# Patient Record
Sex: MALE | Race: ASIAN | NOT HISPANIC OR LATINO | Employment: UNEMPLOYED | URBAN - METROPOLITAN AREA
[De-identification: names, ages, dates, MRNs, and addresses within clinical notes are randomized per-mention and may not be internally consistent; named-entity substitution may affect disease eponyms.]

---

## 2018-04-30 ENCOUNTER — HISTORICAL (OUTPATIENT)
Dept: ADMINISTRATIVE | Facility: HOSPITAL | Age: 78
End: 2018-04-30

## 2018-04-30 LAB
ABS NEUT (OLG): 3.41 X10(3)/MCL (ref 2.1–9.2)
ALBUMIN SERPL-MCNC: 3.8 GM/DL (ref 3.4–5)
ALBUMIN/GLOB SERPL: 1 RATIO (ref 1–2)
ALP SERPL-CCNC: 70 UNIT/L (ref 45–117)
ALT SERPL-CCNC: 30 UNIT/L (ref 12–78)
APPEARANCE, UA: CLEAR
AST SERPL-CCNC: 22 UNIT/L (ref 15–37)
BACTERIA #/AREA URNS AUTO: ABNORMAL /[HPF]
BASOPHILS # BLD AUTO: 0.03 X10(3)/MCL
BASOPHILS NFR BLD AUTO: 0 %
BILIRUB SERPL-MCNC: 0.2 MG/DL (ref 0.2–1)
BILIRUB UR QL STRIP: NEGATIVE
BILIRUBIN DIRECT+TOT PNL SERPL-MCNC: <0.1 MG/DL
BILIRUBIN DIRECT+TOT PNL SERPL-MCNC: ABNORMAL MG/DL
BUN SERPL-MCNC: 25 MG/DL (ref 7–18)
CALCIUM SERPL-MCNC: 8.8 MG/DL (ref 8.5–10.1)
CHLORIDE SERPL-SCNC: 109 MMOL/L (ref 98–107)
CO2 SERPL-SCNC: 27 MMOL/L (ref 21–32)
COLOR UR: NORMAL
CREAT SERPL-MCNC: 1.7 MG/DL (ref 0.6–1.3)
EOSINOPHIL # BLD AUTO: 0.19 10*3/UL
EOSINOPHIL NFR BLD AUTO: 2 %
ERYTHROCYTE [DISTWIDTH] IN BLOOD BY AUTOMATED COUNT: 16.1 % (ref 11.5–14.5)
EST. AVERAGE GLUCOSE BLD GHB EST-MCNC: 126 MG/DL
GLOBULIN SER-MCNC: 4.6 GM/ML (ref 2.3–3.5)
GLUCOSE (UA): NORMAL
GLUCOSE SERPL-MCNC: 91 MG/DL (ref 74–106)
HBA1C MFR BLD: 6 % (ref 4.2–6.3)
HCT VFR BLD AUTO: 38.4 % (ref 40–51)
HGB BLD-MCNC: 12.1 GM/DL (ref 13.5–17.5)
HGB UR QL STRIP: NEGATIVE
HYALINE CASTS #/AREA URNS LPF: ABNORMAL /[LPF]
IMM GRANULOCYTES # BLD AUTO: 0.04 10*3/UL
IMM GRANULOCYTES NFR BLD AUTO: 0 %
KETONES UR QL STRIP: NEGATIVE
LEUKOCYTE ESTERASE UR QL STRIP: NEGATIVE
LYMPHOCYTES # BLD AUTO: 3.79 X10(3)/MCL
LYMPHOCYTES NFR BLD AUTO: 46 % (ref 13–40)
MCH RBC QN AUTO: 26.4 PG (ref 26–34)
MCHC RBC AUTO-ENTMCNC: 31.5 GM/DL (ref 31–37)
MCV RBC AUTO: 83.8 FL (ref 80–100)
MONOCYTES # BLD AUTO: 0.86 X10(3)/MCL
MONOCYTES NFR BLD AUTO: 10 % (ref 4–12)
NEUTROPHILS # BLD AUTO: 3.41 X10(3)/MCL
NEUTROPHILS NFR BLD AUTO: 41 X10(3)/MCL
NITRITE UR QL STRIP: NEGATIVE
PH UR STRIP: 5.5 [PH] (ref 4.5–8)
PLATELET # BLD AUTO: 187 X10(3)/MCL (ref 130–400)
PMV BLD AUTO: 9.1 FL (ref 7.4–10.4)
POTASSIUM SERPL-SCNC: 5 MMOL/L (ref 3.5–5.1)
PROT SERPL-MCNC: 8.4 GM/DL (ref 6.4–8.2)
PROT UR QL STRIP: NEGATIVE
PSA SERPL-MCNC: 1.3 NG/ML
RBC # BLD AUTO: 4.58 X10(6)/MCL (ref 4.5–5.9)
RBC #/AREA URNS AUTO: ABNORMAL /[HPF]
SODIUM SERPL-SCNC: 138 MMOL/L (ref 136–145)
SP GR UR STRIP: 1.01 (ref 1–1.03)
SQUAMOUS #/AREA URNS LPF: ABNORMAL /[LPF]
UROBILINOGEN UR STRIP-ACNC: NORMAL
WBC # SPEC AUTO: 8.3 X10(3)/MCL (ref 4.5–11)
WBC #/AREA URNS AUTO: ABNORMAL /HPF

## 2018-05-25 ENCOUNTER — HISTORICAL (OUTPATIENT)
Dept: ADMINISTRATIVE | Facility: HOSPITAL | Age: 78
End: 2018-05-25

## 2018-05-25 LAB
APPEARANCE, UA: CLEAR
BACTERIA #/AREA URNS AUTO: ABNORMAL /[HPF]
BILIRUB UR QL STRIP: NEGATIVE
COLOR UR: NORMAL
GLUCOSE (UA): NORMAL
HGB UR QL STRIP: NEGATIVE
HYALINE CASTS #/AREA URNS LPF: ABNORMAL /[LPF]
KETONES UR QL STRIP: NEGATIVE
LEUKOCYTE ESTERASE UR QL STRIP: NEGATIVE
NITRITE UR QL STRIP: NEGATIVE
PH UR STRIP: 5.5 [PH] (ref 4.5–8)
PROT UR QL STRIP: NEGATIVE
RBC #/AREA URNS AUTO: ABNORMAL /[HPF]
SP GR UR STRIP: 1.01 (ref 1–1.03)
SQUAMOUS #/AREA URNS LPF: ABNORMAL /[LPF]
UROBILINOGEN UR STRIP-ACNC: NORMAL
WBC #/AREA URNS AUTO: ABNORMAL /HPF

## 2018-05-27 LAB — FINAL CULTURE: NO GROWTH

## 2018-08-30 ENCOUNTER — HISTORICAL (OUTPATIENT)
Dept: FAMILY MEDICINE | Facility: CLINIC | Age: 78
End: 2018-08-30

## 2018-08-30 LAB
BUN SERPL-MCNC: 19 MG/DL (ref 7–18)
CALCIUM SERPL-MCNC: 9 MG/DL (ref 8.5–10.1)
CHLORIDE SERPL-SCNC: 107 MMOL/L (ref 98–107)
CHOLEST SERPL-MCNC: 214 MG/DL
CHOLEST/HDLC SERPL: 3.9 {RATIO} (ref 0–5)
CO2 SERPL-SCNC: 28 MMOL/L (ref 21–32)
CREAT SERPL-MCNC: 1.8 MG/DL (ref 0.6–1.3)
CREAT/UREA NIT SERPL: 11
GLUCOSE SERPL-MCNC: 96 MG/DL (ref 74–106)
HDLC SERPL-MCNC: 55 MG/DL
LDLC SERPL CALC-MCNC: ABNORMAL MG/DL (ref 0–130)
POTASSIUM SERPL-SCNC: 4.5 MMOL/L (ref 3.5–5.1)
SODIUM SERPL-SCNC: 140 MMOL/L (ref 136–145)
T4 FREE SERPL-MCNC: 0.72 NG/DL (ref 0.76–1.46)
TRIGL SERPL-MCNC: 420 MG/DL
TSH SERPL-ACNC: 3.92 MIU/L (ref 0.36–3.74)
VLDLC SERPL CALC-MCNC: ABNORMAL MG/DL

## 2018-10-05 ENCOUNTER — HISTORICAL (OUTPATIENT)
Dept: RADIOLOGY | Facility: HOSPITAL | Age: 78
End: 2018-10-05

## 2019-01-03 ENCOUNTER — HISTORICAL (OUTPATIENT)
Dept: LAB | Facility: HOSPITAL | Age: 79
End: 2019-01-03

## 2019-01-03 ENCOUNTER — HISTORICAL (OUTPATIENT)
Dept: ADMINISTRATIVE | Facility: HOSPITAL | Age: 79
End: 2019-01-03

## 2019-01-03 LAB
APPEARANCE, UA: CLEAR
BACTERIA #/AREA URNS AUTO: ABNORMAL /[HPF]
BILIRUB UR QL STRIP: NEGATIVE
BUN SERPL-MCNC: 25 MG/DL (ref 7–18)
CALCIUM SERPL-MCNC: 9 MG/DL (ref 8.5–10.1)
CHLORIDE SERPL-SCNC: 106 MMOL/L (ref 98–107)
CO2 SERPL-SCNC: 26 MMOL/L (ref 21–32)
COLOR UR: COLORLESS
CREAT SERPL-MCNC: 1.8 MG/DL (ref 0.6–1.3)
CREAT/UREA NIT SERPL: 14
GLUCOSE (UA): NORMAL
GLUCOSE SERPL-MCNC: 94 MG/DL (ref 74–106)
HGB UR QL STRIP: NEGATIVE
HYALINE CASTS #/AREA URNS LPF: ABNORMAL /[LPF]
KETONES UR QL STRIP: NEGATIVE
LEUKOCYTE ESTERASE UR QL STRIP: NEGATIVE
NITRITE UR QL STRIP: NEGATIVE
PH UR STRIP: 5 [PH] (ref 4.5–8)
POTASSIUM SERPL-SCNC: 4.7 MMOL/L (ref 3.5–5.1)
PROT UR QL STRIP: NEGATIVE
RBC #/AREA URNS AUTO: ABNORMAL /[HPF]
SODIUM SERPL-SCNC: 140 MMOL/L (ref 136–145)
SP GR UR STRIP: 1.01 (ref 1–1.03)
SQUAMOUS #/AREA URNS LPF: ABNORMAL /[LPF]
T4 FREE SERPL-MCNC: 0.8 NG/DL (ref 0.76–1.46)
TSH SERPL-ACNC: 2.5 MIU/L (ref 0.36–3.74)
UROBILINOGEN UR STRIP-ACNC: NORMAL
WBC #/AREA URNS AUTO: ABNORMAL /HPF

## 2019-01-05 LAB — FINAL CULTURE: NO GROWTH

## 2019-03-02 ENCOUNTER — HISTORICAL (OUTPATIENT)
Dept: ADMINISTRATIVE | Facility: HOSPITAL | Age: 79
End: 2019-03-02

## 2019-03-02 ENCOUNTER — HOSPITAL ENCOUNTER (OUTPATIENT)
Dept: MEDSURG UNIT | Facility: HOSPITAL | Age: 79
End: 2019-03-03
Attending: FAMILY MEDICINE | Admitting: FAMILY MEDICINE

## 2019-03-02 LAB
ABS NEUT (OLG): 6.25 X10(3)/MCL (ref 2.1–9.2)
ALBUMIN SERPL-MCNC: 3.6 GM/DL (ref 3.4–5)
ALBUMIN/GLOB SERPL: 0.8 RATIO (ref 1.1–2)
ALP SERPL-CCNC: 71 UNIT/L (ref 45–117)
ALT SERPL-CCNC: 25 UNIT/L (ref 12–78)
APPEARANCE, UA: CLEAR
AST SERPL-CCNC: 19 UNIT/L (ref 15–37)
BACTERIA #/AREA URNS AUTO: ABNORMAL /[HPF]
BASOPHILS # BLD AUTO: 0.03 X10(3)/MCL
BASOPHILS NFR BLD AUTO: 0 %
BILIRUB SERPL-MCNC: 0.2 MG/DL (ref 0.2–1)
BILIRUB SERPL-MCNC: NEGATIVE MG/DL
BILIRUB UR QL STRIP: NEGATIVE
BILIRUBIN DIRECT+TOT PNL SERPL-MCNC: 0.1 MG/DL
BILIRUBIN DIRECT+TOT PNL SERPL-MCNC: 0.1 MG/DL
BLOOD URINE, POC: NEGATIVE
BUN SERPL-MCNC: 31 MG/DL (ref 7–18)
BUN SERPL-MCNC: 34 MG/DL (ref 7–18)
CALCIUM SERPL-MCNC: 8 MG/DL (ref 8.5–10.1)
CALCIUM SERPL-MCNC: 8.8 MG/DL (ref 8.5–10.1)
CHLORIDE SERPL-SCNC: 106 MMOL/L (ref 98–107)
CHLORIDE SERPL-SCNC: 110 MMOL/L (ref 98–107)
CK MB SERPL-MCNC: 2.5 NG/ML (ref 1–3.6)
CK MB SERPL-MCNC: 2.8 NG/ML (ref 1–3.6)
CK SERPL-CCNC: 154 UNIT/L (ref 39–308)
CK SERPL-CCNC: 160 UNIT/L (ref 39–308)
CLARITY, POC UA: CLEAR
CO2 SERPL-SCNC: 22 MMOL/L (ref 21–32)
CO2 SERPL-SCNC: 23 MMOL/L (ref 21–32)
COLOR UR: NORMAL
COLOR, POC UA: YELLOW
CREAT SERPL-MCNC: 2.2 MG/DL (ref 0.6–1.3)
CREAT SERPL-MCNC: 2.4 MG/DL (ref 0.6–1.3)
CREAT/UREA NIT SERPL: 14
EOSINOPHIL # BLD AUTO: 0.17 X10(3)/MCL
EOSINOPHIL NFR BLD AUTO: 2 %
ERYTHROCYTE [DISTWIDTH] IN BLOOD BY AUTOMATED COUNT: 13.4 % (ref 11.5–14.5)
FLUAV AG NPH QL IA: NEGATIVE
FLUBV AG NPH QL IA: NEGATIVE
GLOBULIN SER-MCNC: 4.8 GM/ML (ref 2.3–3.5)
GLUCOSE (UA): NORMAL
GLUCOSE SERPL-MCNC: 111 MG/DL (ref 74–106)
GLUCOSE SERPL-MCNC: 86 MG/DL (ref 74–106)
GLUCOSE UR QL STRIP: NEGATIVE
HCT VFR BLD AUTO: 34.2 % (ref 40–51)
HGB BLD-MCNC: 11.5 GM/DL (ref 13.5–17.5)
HGB UR QL STRIP: NEGATIVE
HYALINE CASTS #/AREA URNS LPF: ABNORMAL /[LPF]
IMM GRANULOCYTES # BLD AUTO: 0.03 10*3/UL
IMM GRANULOCYTES NFR BLD AUTO: 0 %
KETONES UR QL STRIP: NEGATIVE
KETONES UR QL STRIP: NEGATIVE
LACTATE SERPL-SCNC: 1.5 MMOL/L (ref 0.4–2)
LACTATE SERPL-SCNC: 2.2 MMOL/L (ref 0.4–2)
LEUKOCYTE EST, POC UA: NEGATIVE
LEUKOCYTE ESTERASE UR QL STRIP: NEGATIVE
LYMPHOCYTES # BLD AUTO: 2.61 X10(3)/MCL
LYMPHOCYTES NFR BLD AUTO: 26 % (ref 13–40)
MAGNESIUM SERPL-MCNC: 2 MG/DL (ref 1.6–2.6)
MCH RBC QN AUTO: 29 PG (ref 26–34)
MCHC RBC AUTO-ENTMCNC: 33.6 GM/DL (ref 31–37)
MCV RBC AUTO: 86.4 FL (ref 80–100)
MONOCYTES # BLD AUTO: 0.75 X10(3)/MCL
MONOCYTES NFR BLD AUTO: 8 % (ref 4–12)
NEUTROPHILS # BLD AUTO: 6.25 X10(3)/MCL
NEUTROPHILS NFR BLD AUTO: 64 X10(3)/MCL
NITRITE UR QL STRIP: NEGATIVE
NITRITE, POC UA: NEGATIVE
PH UR STRIP: 5 [PH] (ref 4.5–8)
PH, POC UA: 5.5
PHOSPHATE SERPL-MCNC: 3.4 MG/DL (ref 2.5–4.9)
PLATELET # BLD AUTO: 212 X10(3)/MCL (ref 130–400)
PMV BLD AUTO: 9.2 FL (ref 7.4–10.4)
POTASSIUM SERPL-SCNC: 4.3 MMOL/L (ref 3.5–5.1)
POTASSIUM SERPL-SCNC: 4.6 MMOL/L (ref 3.5–5.1)
PROT SERPL-MCNC: 8.4 GM/DL (ref 6.4–8.2)
PROT UR QL STRIP: NEGATIVE
PROTEIN, POC: NEGATIVE
RBC # BLD AUTO: 3.96 X10(6)/MCL (ref 4.5–5.9)
RBC #/AREA URNS AUTO: ABNORMAL /[HPF]
SODIUM SERPL-SCNC: 135 MMOL/L (ref 136–145)
SODIUM SERPL-SCNC: 138 MMOL/L (ref 136–145)
SP GR UR STRIP: 1.01 (ref 1–1.03)
SPECIFIC GRAVITY, POC UA: 1.02
SQUAMOUS #/AREA URNS LPF: ABNORMAL /[LPF]
TROPONIN I SERPL-MCNC: 0.09 NG/ML (ref 0–0.05)
TROPONIN I SERPL-MCNC: 0.1 NG/ML (ref 0–0.05)
UROBILINOGEN UR STRIP-ACNC: NORMAL
UROBILINOGEN, POC UA: NORMAL
WBC # SPEC AUTO: 9.8 X10(3)/MCL (ref 4.5–11)
WBC #/AREA URNS AUTO: ABNORMAL /HPF

## 2019-03-03 LAB
ABS NEUT (OLG): 4.09 X10(3)/MCL (ref 2.1–9.2)
BASOPHILS # BLD AUTO: 0.02 X10(3)/MCL
BASOPHILS NFR BLD AUTO: 0 %
BUN SERPL-MCNC: 26 MG/DL (ref 7–18)
CALCIUM SERPL-MCNC: 8 MG/DL (ref 8.5–10.1)
CHLORIDE SERPL-SCNC: 115 MMOL/L (ref 98–107)
CK MB SERPL-MCNC: 2.4 NG/ML (ref 1–3.6)
CK SERPL-CCNC: 154 UNIT/L (ref 39–308)
CO2 SERPL-SCNC: 23 MMOL/L (ref 21–32)
CREAT SERPL-MCNC: 1.9 MG/DL (ref 0.6–1.3)
CREAT/UREA NIT SERPL: 14
EOSINOPHIL # BLD AUTO: 0.26 10*3/UL
EOSINOPHIL NFR BLD AUTO: 3 %
ERYTHROCYTE [DISTWIDTH] IN BLOOD BY AUTOMATED COUNT: 13.3 % (ref 11.5–14.5)
GLUCOSE SERPL-MCNC: 94 MG/DL (ref 74–106)
HCT VFR BLD AUTO: 31.6 % (ref 40–51)
HGB BLD-MCNC: 10.3 GM/DL (ref 13.5–17.5)
IMM GRANULOCYTES # BLD AUTO: 0.02 10*3/UL
IMM GRANULOCYTES NFR BLD AUTO: 0 %
LYMPHOCYTES # BLD AUTO: 3.2 X10(3)/MCL
LYMPHOCYTES NFR BLD AUTO: 38 % (ref 13–40)
MCH RBC QN AUTO: 28.5 PG (ref 26–34)
MCHC RBC AUTO-ENTMCNC: 32.6 GM/DL (ref 31–37)
MCV RBC AUTO: 87.5 FL (ref 80–100)
MONOCYTES # BLD AUTO: 0.88 X10(3)/MCL
MONOCYTES NFR BLD AUTO: 10 % (ref 4–12)
NEUTROPHILS # BLD AUTO: 4.09 X10(3)/MCL
NEUTROPHILS NFR BLD AUTO: 48 X10(3)/MCL
PLATELET # BLD AUTO: 182 X10(3)/MCL (ref 130–400)
PMV BLD AUTO: 8.9 FL (ref 7.4–10.4)
POTASSIUM SERPL-SCNC: 4.6 MMOL/L (ref 3.5–5.1)
RBC # BLD AUTO: 3.61 X10(6)/MCL (ref 4.5–5.9)
SODIUM SERPL-SCNC: 146 MMOL/L (ref 136–145)
TROPONIN I SERPL-MCNC: 0.1 NG/ML (ref 0–0.05)
WBC # SPEC AUTO: 8.5 X10(3)/MCL (ref 4.5–11)

## 2019-03-04 LAB — FINAL CULTURE: NORMAL

## 2019-03-07 LAB
FINAL CULTURE: NORMAL
FINAL CULTURE: NORMAL

## 2019-04-02 ENCOUNTER — HISTORICAL (OUTPATIENT)
Dept: ADMINISTRATIVE | Facility: HOSPITAL | Age: 79
End: 2019-04-02

## 2019-04-02 LAB
ALBUMIN SERPL-MCNC: 4 GM/DL (ref 3.4–5)
ALBUMIN/GLOB SERPL: 0.8 RATIO (ref 1.1–2)
ALP SERPL-CCNC: 73 UNIT/L (ref 45–117)
ALT SERPL-CCNC: 27 UNIT/L (ref 12–78)
AST SERPL-CCNC: 37 UNIT/L (ref 15–37)
BILIRUB SERPL-MCNC: 0.3 MG/DL (ref 0.2–1)
BILIRUBIN DIRECT+TOT PNL SERPL-MCNC: 0.1 MG/DL
BILIRUBIN DIRECT+TOT PNL SERPL-MCNC: 0.2 MG/DL
BUN SERPL-MCNC: 22 MG/DL (ref 7–18)
CALCIUM SERPL-MCNC: 9.4 MG/DL (ref 8.5–10.1)
CHLORIDE SERPL-SCNC: 106 MMOL/L (ref 98–107)
CO2 SERPL-SCNC: 29 MMOL/L (ref 21–32)
CREAT SERPL-MCNC: 2.1 MG/DL (ref 0.6–1.3)
GLOBULIN SER-MCNC: 4.9 GM/ML (ref 2.3–3.5)
GLUCOSE SERPL-MCNC: 70 MG/DL (ref 74–106)
POTASSIUM SERPL-SCNC: 4.5 MMOL/L (ref 3.5–5.1)
PROT SERPL-MCNC: 8.9 GM/DL (ref 6.4–8.2)
SODIUM SERPL-SCNC: 140 MMOL/L (ref 136–145)

## 2019-07-19 ENCOUNTER — HISTORICAL (OUTPATIENT)
Dept: ADMINISTRATIVE | Facility: HOSPITAL | Age: 79
End: 2019-07-19

## 2019-07-19 LAB
ABS NEUT (OLG): 3.93 X10(3)/MCL (ref 2.1–9.2)
ALBUMIN SERPL-MCNC: 3.8 GM/DL (ref 3.4–5)
ALBUMIN/GLOB SERPL: 0.8 RATIO (ref 1.1–2)
ALP SERPL-CCNC: 77 UNIT/L (ref 45–117)
ALT SERPL-CCNC: 29 UNIT/L (ref 12–78)
AST SERPL-CCNC: 20 UNIT/L (ref 15–37)
BASOPHILS # BLD AUTO: 0.03 X10(3)/MCL
BASOPHILS NFR BLD AUTO: 0 %
BILIRUB SERPL-MCNC: 0.4 MG/DL (ref 0.2–1)
BILIRUBIN DIRECT+TOT PNL SERPL-MCNC: 0.1 MG/DL
BILIRUBIN DIRECT+TOT PNL SERPL-MCNC: 0.3 MG/DL
BUN SERPL-MCNC: 18 MG/DL (ref 7–18)
CALCIUM SERPL-MCNC: 9.3 MG/DL (ref 8.5–10.1)
CHLORIDE SERPL-SCNC: 105 MMOL/L (ref 98–107)
CO2 SERPL-SCNC: 28 MMOL/L (ref 21–32)
CREAT SERPL-MCNC: 1.7 MG/DL (ref 0.6–1.3)
EOSINOPHIL # BLD AUTO: 0.07 10*3/UL
EOSINOPHIL NFR BLD AUTO: 1 %
ERYTHROCYTE [DISTWIDTH] IN BLOOD BY AUTOMATED COUNT: 13.2 % (ref 11.5–14.5)
FOLATE SERPL-MCNC: 20.6 NG/ML (ref 3.1–17.5)
GLOBULIN SER-MCNC: 4.8 GM/ML (ref 2.3–3.5)
GLUCOSE SERPL-MCNC: 103 MG/DL (ref 74–106)
HCT VFR BLD AUTO: 43.6 % (ref 40–51)
HGB BLD-MCNC: 14.2 GM/DL (ref 13.5–17.5)
IMM GRANULOCYTES # BLD AUTO: 0.02 10*3/UL
IMM GRANULOCYTES NFR BLD AUTO: 0 %
LYMPHOCYTES # BLD AUTO: 3.58 X10(3)/MCL
LYMPHOCYTES NFR BLD AUTO: 43 % (ref 13–40)
MCH RBC QN AUTO: 27.6 PG (ref 26–34)
MCHC RBC AUTO-ENTMCNC: 32.6 GM/DL (ref 31–37)
MCV RBC AUTO: 84.7 FL (ref 80–100)
MONOCYTES # BLD AUTO: 0.66 X10(3)/MCL
MONOCYTES NFR BLD AUTO: 8 % (ref 0–24)
NEUTROPHILS # BLD AUTO: 3.93 X10(3)/MCL
NEUTROPHILS NFR BLD AUTO: 47 X10(3)/MCL
PLATELET # BLD AUTO: 254 X10(3)/MCL (ref 130–400)
PMV BLD AUTO: 9.4 FL (ref 7.4–10.4)
POTASSIUM SERPL-SCNC: 4 MMOL/L (ref 3.5–5.1)
PROT SERPL-MCNC: 8.6 GM/DL (ref 6.4–8.2)
RBC # BLD AUTO: 5.15 X10(6)/MCL (ref 4.5–5.9)
SODIUM SERPL-SCNC: 138 MMOL/L (ref 136–145)
T4 FREE SERPL-MCNC: 0.89 NG/DL (ref 0.76–1.46)
TSH SERPL-ACNC: 1.97 MIU/L (ref 0.36–3.74)
VIT B12 SERPL-MCNC: 387 PG/ML (ref 193–986)
WBC # SPEC AUTO: 8.3 X10(3)/MCL (ref 4.5–11)

## 2019-08-15 ENCOUNTER — HISTORICAL (OUTPATIENT)
Dept: ADMINISTRATIVE | Facility: HOSPITAL | Age: 79
End: 2019-08-15

## 2019-08-15 LAB
CHOLEST SERPL-MCNC: 180 MG/DL
CHOLEST/HDLC SERPL: 3.8 {RATIO} (ref 0–5)
CREAT UR-MCNC: 238 MG/DL
HDLC SERPL-MCNC: 47 MG/DL
LDLC SERPL CALC-MCNC: 59 MG/DL (ref 0–130)
MICROALBUMIN UR-MCNC: 12.8 MG/L (ref 0–19)
MICROALBUMIN/CREAT RATIO PNL UR: 5.4 MCG/MG CR (ref 0–29)
T PALLIDUM AB SER QL: NONREACTIVE
TRIGL SERPL-MCNC: 372 MG/DL
VLDLC SERPL CALC-MCNC: 74 MG/DL

## 2020-01-10 ENCOUNTER — HISTORICAL (OUTPATIENT)
Dept: ADMINISTRATIVE | Facility: HOSPITAL | Age: 80
End: 2020-01-10

## 2020-01-10 LAB
ABS NEUT (OLG): 5.21 X10(3)/MCL (ref 2.1–9.2)
ALBUMIN SERPL-MCNC: 3.4 GM/DL (ref 3.4–5)
ALBUMIN/GLOB SERPL: 0.6 RATIO (ref 1.1–2)
ALP SERPL-CCNC: 83 UNIT/L (ref 45–117)
ALT SERPL-CCNC: 32 UNIT/L (ref 12–78)
APPEARANCE, UA: ABNORMAL
AST SERPL-CCNC: 23 UNIT/L (ref 15–37)
BACTERIA #/AREA URNS AUTO: ABNORMAL /HPF
BASOPHILS # BLD AUTO: 0 X10(3)/MCL (ref 0–0.2)
BASOPHILS NFR BLD AUTO: 0 %
BILIRUB SERPL-MCNC: 0.2 MG/DL (ref 0.2–1)
BILIRUB UR QL STRIP: NEGATIVE
BILIRUBIN DIRECT+TOT PNL SERPL-MCNC: <0.1 MG/DL (ref 0–0.2)
BILIRUBIN DIRECT+TOT PNL SERPL-MCNC: ABNORMAL MG/DL
BUN SERPL-MCNC: 30 MG/DL (ref 7–18)
CALCIUM SERPL-MCNC: 9.4 MG/DL (ref 8.5–10.1)
CHLORIDE SERPL-SCNC: 109 MMOL/L (ref 98–107)
CHOLEST SERPL-MCNC: 139 MG/DL
CHOLEST/HDLC SERPL: 3.2 {RATIO} (ref 0–5)
CO2 SERPL-SCNC: 25 MMOL/L (ref 21–32)
COLOR UR: ABNORMAL
CREAT SERPL-MCNC: 2.2 MG/DL (ref 0.6–1.3)
EOSINOPHIL # BLD AUTO: 0.2 X10(3)/MCL (ref 0–0.9)
EOSINOPHIL NFR BLD AUTO: 2 %
ERYTHROCYTE [DISTWIDTH] IN BLOOD BY AUTOMATED COUNT: 13.9 % (ref 11.5–14.5)
GLOBULIN SER-MCNC: 5.3 GM/ML (ref 2.3–3.5)
GLUCOSE (UA): NEGATIVE
GLUCOSE SERPL-MCNC: 112 MG/DL (ref 74–106)
HCT VFR BLD AUTO: 39 % (ref 40–51)
HDLC SERPL-MCNC: 43 MG/DL (ref 40–59)
HGB BLD-MCNC: 12.3 GM/DL (ref 13.5–17.5)
HGB UR QL STRIP: 0.2
HYALINE CASTS #/AREA URNS LPF: ABNORMAL /LPF
IMM GRANULOCYTES # BLD AUTO: 0.03 10*3/UL
IMM GRANULOCYTES NFR BLD AUTO: 0 %
KETONES UR QL STRIP: NEGATIVE
LDLC SERPL CALC-MCNC: 44 MG/DL
LEUKOCYTE ESTERASE UR QL STRIP: 500 LEU/UL
LYMPHOCYTES # BLD AUTO: 4.6 X10(3)/MCL (ref 0.6–4.6)
LYMPHOCYTES NFR BLD AUTO: 42 %
MCH RBC QN AUTO: 27.8 PG (ref 26–34)
MCHC RBC AUTO-ENTMCNC: 31.5 GM/DL (ref 31–37)
MCV RBC AUTO: 88 FL (ref 80–100)
MONOCYTES # BLD AUTO: 0.9 X10(3)/MCL (ref 0.1–1.3)
MONOCYTES NFR BLD AUTO: 8 %
NEUTROPHILS # BLD AUTO: 5.21 X10(3)/MCL (ref 2.1–9.2)
NEUTROPHILS NFR BLD AUTO: 47 %
NITRITE UR QL STRIP: NEGATIVE
PH UR STRIP: 5.5 [PH] (ref 4.5–8)
PLATELET # BLD AUTO: 256 X10(3)/MCL (ref 130–400)
PMV BLD AUTO: 9.5 FL (ref 7.4–10.4)
POTASSIUM SERPL-SCNC: 4.6 MMOL/L (ref 3.5–5.1)
PROT SERPL-MCNC: 8.7 GM/DL (ref 6.4–8.2)
PROT UR QL STRIP: 100 MG/DL
RBC # BLD AUTO: 4.43 X10(6)/MCL (ref 4.5–5.9)
RBC #/AREA URNS AUTO: ABNORMAL /HPF
SODIUM SERPL-SCNC: 141 MMOL/L (ref 136–145)
SP GR UR STRIP: 1.01 (ref 1–1.03)
SQUAMOUS #/AREA URNS LPF: ABNORMAL /LPF
TRIGL SERPL-MCNC: 259 MG/DL
UROBILINOGEN UR STRIP-ACNC: NORMAL
VLDLC SERPL CALC-MCNC: 52 MG/DL
WBC # SPEC AUTO: 11 X10(3)/MCL (ref 4.5–11)
WBC #/AREA URNS AUTO: >=100 /HPF

## 2020-02-03 ENCOUNTER — HISTORICAL (OUTPATIENT)
Dept: RADIOLOGY | Facility: HOSPITAL | Age: 80
End: 2020-02-03

## 2020-02-20 ENCOUNTER — HISTORICAL (OUTPATIENT)
Dept: NEPHROLOGY | Facility: CLINIC | Age: 80
End: 2020-02-20

## 2020-02-20 LAB
ABS NEUT (OLG): 3.46 X10(3)/MCL (ref 2.1–9.2)
ALBUMIN SERPL-MCNC: 3.6 GM/DL (ref 3.4–5)
ALBUMIN/GLOB SERPL: 0.8 RATIO (ref 1.1–2)
ALP SERPL-CCNC: 74 UNIT/L (ref 45–117)
ALT SERPL-CCNC: 28 UNIT/L (ref 12–78)
APPEARANCE, UA: CLEAR
AST SERPL-CCNC: 22 UNIT/L (ref 15–37)
BACTERIA #/AREA URNS AUTO: ABNORMAL /HPF
BASOPHILS NFR BLD MANUAL: 0 %
BILIRUB SERPL-MCNC: 0.3 MG/DL (ref 0.2–1)
BILIRUB UR QL STRIP: NEGATIVE
BILIRUBIN DIRECT+TOT PNL SERPL-MCNC: 0.1 MG/DL (ref 0–0.2)
BILIRUBIN DIRECT+TOT PNL SERPL-MCNC: 0.2 MG/DL
BUN SERPL-MCNC: 23 MG/DL (ref 7–18)
CALCIUM SERPL-MCNC: 8.8 MG/DL (ref 8.5–10.1)
CHLORIDE SERPL-SCNC: 107 MMOL/L (ref 98–107)
CO2 SERPL-SCNC: 22 MMOL/L (ref 21–32)
COLOR UR: ABNORMAL
CREAT SERPL-MCNC: 1.8 MG/DL (ref 0.6–1.3)
CREAT UR-MCNC: 150 MG/DL
DEPRECATED CALCIDIOL+CALCIFEROL SERPL-MC: 15.7 NG/ML (ref 30–80)
EOSINOPHIL NFR BLD MANUAL: 2 %
ERYTHROCYTE [DISTWIDTH] IN BLOOD BY AUTOMATED COUNT: 13.3 % (ref 11.5–14.5)
FERRITIN SERPL-MCNC: 36.8 NG/ML (ref 26–388)
GLOBULIN SER-MCNC: 4.7 GM/ML (ref 2.3–3.5)
GLUCOSE (UA): NEGATIVE
GLUCOSE SERPL-MCNC: 124 MG/DL (ref 74–106)
GRANULOCYTES NFR BLD MANUAL: 37 % (ref 43–75)
HCT VFR BLD AUTO: 39.8 % (ref 40–51)
HGB BLD-MCNC: 12.8 GM/DL (ref 13.5–17.5)
HGB UR QL STRIP: NEGATIVE
HYALINE CASTS #/AREA URNS LPF: ABNORMAL /LPF
IRON SATN MFR SERPL: 23.8 % (ref 15–50)
IRON SERPL-MCNC: 85 MCG/DL (ref 65–175)
KETONES UR QL STRIP: NEGATIVE
LEUKOCYTE ESTERASE UR QL STRIP: 75 LEU/UL
LYMPHOCYTES NFR BLD MANUAL: 57 % (ref 20.5–51.1)
MAGNESIUM SERPL-MCNC: 2 MG/DL (ref 1.6–2.6)
MCH RBC QN AUTO: 28.2 PG (ref 26–34)
MCHC RBC AUTO-ENTMCNC: 32.2 GM/DL (ref 31–37)
MCV RBC AUTO: 87.7 FL (ref 80–100)
MONOCYTES NFR BLD MANUAL: 4 % (ref 2–9)
NITRITE UR QL STRIP: NEGATIVE
PH UR STRIP: 5 [PH] (ref 4.5–8)
PHOSPHATE SERPL-MCNC: 2.8 MG/DL (ref 2.5–4.9)
PLATELET # BLD AUTO: 215 X10(3)/MCL (ref 130–400)
PLATELET # BLD EST: ABNORMAL 10*3/UL
PMV BLD AUTO: 9.6 FL (ref 7.4–10.4)
POTASSIUM SERPL-SCNC: 4.3 MMOL/L (ref 3.5–5.1)
PROT SERPL-MCNC: 8.3 GM/DL (ref 6.4–8.2)
PROT UR QL STRIP: NEGATIVE
PROT UR STRIP-MCNC: 17.8 MG/DL
PROT/CREAT UR-RTO: 118.7 MG/GM
PTH-INTACT SERPL-MCNC: 83.4 PG/ML (ref 18.4–80.1)
RBC # BLD AUTO: 4.54 X10(6)/MCL (ref 4.5–5.9)
RBC #/AREA URNS AUTO: ABNORMAL /HPF
RBC MORPH BLD: NORMAL
SODIUM SERPL-SCNC: 138 MMOL/L (ref 136–145)
SP GR UR STRIP: 1.01 (ref 1–1.03)
SQUAMOUS #/AREA URNS LPF: ABNORMAL /LPF
TIBC SERPL-MCNC: 357 MCG/DL (ref 250–450)
TRANSFERRIN SERPL-MCNC: 270 MG/DL (ref 200–360)
URATE SERPL-MCNC: 6.3 MG/DL (ref 3.5–7.2)
UROBILINOGEN UR STRIP-ACNC: NORMAL
WBC # SPEC AUTO: 9.1 X10(3)/MCL (ref 4.5–11)
WBC #/AREA URNS AUTO: ABNORMAL /HPF

## 2021-01-29 ENCOUNTER — HISTORICAL (OUTPATIENT)
Dept: ADMINISTRATIVE | Facility: HOSPITAL | Age: 81
End: 2021-01-29

## 2021-01-29 LAB
ABS NEUT (OLG): 3.49 X10(3)/MCL (ref 2.1–9.2)
ALBUMIN SERPL-MCNC: 3.8 GM/DL (ref 3.4–4.8)
ALBUMIN/GLOB SERPL: 0.9 RATIO (ref 1.1–2)
ALP SERPL-CCNC: 77 UNIT/L (ref 40–150)
ALT SERPL-CCNC: 20 UNIT/L (ref 0–55)
AST SERPL-CCNC: 21 UNIT/L (ref 5–34)
BASOPHILS # BLD AUTO: 0 X10(3)/MCL (ref 0–0.2)
BASOPHILS NFR BLD AUTO: 0 %
BILIRUB SERPL-MCNC: 0.3 MG/DL
BILIRUBIN DIRECT+TOT PNL SERPL-MCNC: 0.1 MG/DL (ref 0–0.5)
BILIRUBIN DIRECT+TOT PNL SERPL-MCNC: 0.2 MG/DL (ref 0–0.8)
BUN SERPL-MCNC: 20 MG/DL (ref 8.4–25.7)
CALCIUM SERPL-MCNC: 9.9 MG/DL (ref 8.8–10)
CHLORIDE SERPL-SCNC: 104 MMOL/L (ref 98–107)
CHOLEST SERPL-MCNC: 159 MG/DL
CHOLEST/HDLC SERPL: 4 {RATIO} (ref 0–5)
CO2 SERPL-SCNC: 25 MMOL/L (ref 23–31)
CREAT SERPL-MCNC: 1.89 MG/DL (ref 0.73–1.18)
DEPRECATED CALCIDIOL+CALCIFEROL SERPL-MC: 47.6 NG/ML (ref 30–80)
EOSINOPHIL # BLD AUTO: 0.2 X10(3)/MCL (ref 0–0.9)
EOSINOPHIL NFR BLD AUTO: 2 %
ERYTHROCYTE [DISTWIDTH] IN BLOOD BY AUTOMATED COUNT: 14.7 % (ref 11.5–14.5)
EST. AVERAGE GLUCOSE BLD GHB EST-MCNC: 114 MG/DL
GLOBULIN SER-MCNC: 4.3 GM/DL (ref 2.4–3.5)
GLUCOSE SERPL-MCNC: 85 MG/DL (ref 82–115)
HBA1C MFR BLD: 5.6 %
HCT VFR BLD AUTO: 42.2 % (ref 40–51)
HDLC SERPL-MCNC: 44 MG/DL (ref 35–60)
HGB BLD-MCNC: 13.3 GM/DL (ref 13.5–17.5)
IMM GRANULOCYTES # BLD AUTO: 0.02 10*3/UL
IMM GRANULOCYTES NFR BLD AUTO: 0 %
LDLC SERPL CALC-MCNC: 69 MG/DL (ref 50–140)
LYMPHOCYTES # BLD AUTO: 3.5 X10(3)/MCL (ref 0.6–4.6)
LYMPHOCYTES NFR BLD AUTO: 43 %
MCH RBC QN AUTO: 26.7 PG (ref 26–34)
MCHC RBC AUTO-ENTMCNC: 31.5 GM/DL (ref 31–37)
MCV RBC AUTO: 84.7 FL (ref 80–100)
MONOCYTES # BLD AUTO: 0.9 X10(3)/MCL (ref 0.1–1.3)
MONOCYTES NFR BLD AUTO: 11 %
NEUTROPHILS # BLD AUTO: 3.49 X10(3)/MCL (ref 2.1–9.2)
NEUTROPHILS NFR BLD AUTO: 43 %
PLATELET # BLD AUTO: 244 X10(3)/MCL (ref 130–400)
PMV BLD AUTO: 10.1 FL (ref 7.4–10.4)
POTASSIUM SERPL-SCNC: 4.4 MMOL/L (ref 3.5–5.1)
PROT SERPL-MCNC: 8.1 GM/DL (ref 5.8–7.6)
RBC # BLD AUTO: 4.98 X10(6)/MCL (ref 4.5–5.9)
SODIUM SERPL-SCNC: 141 MMOL/L (ref 136–145)
TRIGL SERPL-MCNC: 230 MG/DL (ref 34–140)
URATE SERPL-MCNC: 6.7 MG/DL (ref 3.5–7.2)
VIT B12 SERPL-MCNC: 529 PG/ML (ref 213–816)
VLDLC SERPL CALC-MCNC: 46 MG/DL
WBC # SPEC AUTO: 8.1 X10(3)/MCL (ref 4.5–11)

## 2022-01-01 ENCOUNTER — TELEPHONE (OUTPATIENT)
Dept: FAMILY MEDICINE | Facility: CLINIC | Age: 82
End: 2022-01-01

## 2022-01-01 ENCOUNTER — OFFICE VISIT (OUTPATIENT)
Dept: FAMILY MEDICINE | Facility: CLINIC | Age: 82
End: 2022-01-01

## 2022-01-01 VITALS
HEART RATE: 58 BPM | SYSTOLIC BLOOD PRESSURE: 101 MMHG | DIASTOLIC BLOOD PRESSURE: 50 MMHG | RESPIRATION RATE: 18 BRPM | OXYGEN SATURATION: 100 %

## 2022-01-01 DIAGNOSIS — Z76.0 ENCOUNTER FOR MEDICATION REFILL: Primary | ICD-10-CM

## 2022-01-01 DIAGNOSIS — E55.9 HYPOVITAMINOSIS D: ICD-10-CM

## 2022-01-01 DIAGNOSIS — I42.0 PRIMARY DILATED CARDIOMYOPATHY: ICD-10-CM

## 2022-01-01 DIAGNOSIS — F03.90 DEMENTIA, UNSPECIFIED DEMENTIA SEVERITY, UNSPECIFIED DEMENTIA TYPE, UNSPECIFIED WHETHER BEHAVIORAL, PSYCHOTIC, OR MOOD DISTURBANCE OR ANXIETY: Primary | ICD-10-CM

## 2022-01-01 DIAGNOSIS — R30.0 DYSURIA: ICD-10-CM

## 2022-01-01 DIAGNOSIS — N18.9 CHRONIC KIDNEY DISEASE, UNSPECIFIED CKD STAGE: Primary | ICD-10-CM

## 2022-01-01 PROCEDURE — 99213 OFFICE O/P EST LOW 20 MIN: CPT | Mod: PBBFAC

## 2022-01-01 RX ORDER — NAPROXEN SODIUM 220 MG/1
81 TABLET, FILM COATED ORAL
COMMUNITY
End: 2022-01-01

## 2022-01-01 RX ORDER — MEMANTINE HYDROCHLORIDE 10 MG/1
10 TABLET ORAL 2 TIMES DAILY
Qty: 90 TABLET | Refills: 0 | Status: SHIPPED | OUTPATIENT
Start: 2022-01-01 | End: 2022-01-01 | Stop reason: SDUPTHER

## 2022-01-01 RX ORDER — DOCUSATE SODIUM 100 MG/1
200 CAPSULE, LIQUID FILLED ORAL NIGHTLY PRN
Qty: 180 CAPSULE | Refills: 0 | Status: SHIPPED | OUTPATIENT
Start: 2022-01-01

## 2022-01-01 RX ORDER — METOPROLOL TARTRATE 25 MG/1
12.5 TABLET ORAL 2 TIMES DAILY
COMMUNITY
Start: 2022-01-09 | End: 2023-01-01

## 2022-01-01 RX ORDER — MEMANTINE HYDROCHLORIDE 10 MG/1
10 TABLET ORAL 2 TIMES DAILY
Qty: 30 TABLET | Refills: 0 | Status: SHIPPED | OUTPATIENT
Start: 2022-01-01 | End: 2022-01-01 | Stop reason: SDUPTHER

## 2022-01-01 RX ORDER — LOSARTAN POTASSIUM 25 MG/1
25 TABLET ORAL DAILY
Qty: 90 TABLET | Refills: 0 | Status: SHIPPED | OUTPATIENT
Start: 2022-01-01 | End: 2023-01-01 | Stop reason: SDUPTHER

## 2022-01-01 RX ORDER — QUETIAPINE FUMARATE 100 MG/1
100 TABLET, FILM COATED ORAL NIGHTLY
Qty: 90 TABLET | Refills: 0 | Status: SHIPPED | OUTPATIENT
Start: 2022-01-01 | End: 2023-01-01 | Stop reason: SDUPTHER

## 2022-01-01 RX ORDER — CLOPIDOGREL BISULFATE 75 MG/1
75 TABLET ORAL DAILY
COMMUNITY
End: 2022-01-01 | Stop reason: SDUPTHER

## 2022-01-01 RX ORDER — NITROGLYCERIN 0.4 MG/1
0.4 TABLET SUBLINGUAL EVERY 5 MIN PRN
COMMUNITY
End: 2023-01-01 | Stop reason: SDUPTHER

## 2022-01-01 RX ORDER — ISOSORBIDE MONONITRATE 30 MG/1
30 TABLET, EXTENDED RELEASE ORAL DAILY
Qty: 90 TABLET | Refills: 0 | Status: SHIPPED | OUTPATIENT
Start: 2022-01-01 | End: 2023-01-01 | Stop reason: SDUPTHER

## 2022-01-01 RX ORDER — QUETIAPINE FUMARATE 100 MG/1
100 TABLET, FILM COATED ORAL NIGHTLY
Qty: 30 TABLET | Refills: 0 | Status: SHIPPED | OUTPATIENT
Start: 2022-01-01 | End: 2022-01-01 | Stop reason: SDUPTHER

## 2022-01-01 RX ORDER — DOCUSATE SODIUM 100 MG/1
100 CAPSULE, LIQUID FILLED ORAL NIGHTLY PRN
COMMUNITY
End: 2022-01-01 | Stop reason: SDUPTHER

## 2022-01-01 RX ORDER — ASPIRIN 81 MG/1
81 TABLET ORAL DAILY
COMMUNITY
Start: 2021-08-05

## 2022-01-01 RX ORDER — MEMANTINE HYDROCHLORIDE 10 MG/1
10 TABLET ORAL 2 TIMES DAILY
Qty: 60 TABLET | Refills: 3 | Status: SHIPPED | OUTPATIENT
Start: 2022-01-01 | End: 2022-01-01

## 2022-01-01 RX ORDER — ISOSORBIDE MONONITRATE 30 MG/1
30 TABLET, EXTENDED RELEASE ORAL DAILY
COMMUNITY
End: 2022-01-01 | Stop reason: SDUPTHER

## 2022-01-01 RX ORDER — QUETIAPINE FUMARATE 100 MG/1
150 TABLET, FILM COATED ORAL
COMMUNITY
End: 2022-01-01

## 2022-01-01 RX ORDER — ATORVASTATIN CALCIUM 20 MG/1
20 TABLET, FILM COATED ORAL DAILY
COMMUNITY
End: 2022-01-01 | Stop reason: SDUPTHER

## 2022-01-01 RX ORDER — QUETIAPINE FUMARATE 100 MG/1
100 TABLET, FILM COATED ORAL NIGHTLY
COMMUNITY
End: 2022-01-01 | Stop reason: SDUPTHER

## 2022-01-01 RX ORDER — LISINOPRIL 2.5 MG/1
2.5 TABLET ORAL DAILY
COMMUNITY
Start: 2021-10-18 | End: 2022-01-01 | Stop reason: SDUPTHER

## 2022-01-01 RX ORDER — MEMANTINE HYDROCHLORIDE 10 MG/1
10 TABLET ORAL 2 TIMES DAILY
COMMUNITY
End: 2022-01-01 | Stop reason: SDUPTHER

## 2022-01-01 RX ORDER — LISINOPRIL 2.5 MG/1
2.5 TABLET ORAL DAILY
Qty: 90 TABLET | Refills: 0 | Status: SHIPPED | OUTPATIENT
Start: 2022-01-01 | End: 2023-01-01

## 2022-01-01 RX ORDER — ATORVASTATIN CALCIUM 20 MG/1
20 TABLET, FILM COATED ORAL DAILY
Qty: 90 TABLET | Refills: 0 | Status: SHIPPED | OUTPATIENT
Start: 2022-01-01 | End: 2023-01-01 | Stop reason: SDUPTHER

## 2022-01-01 RX ORDER — METOPROLOL TARTRATE 25 MG/1
12.5 TABLET, FILM COATED ORAL 2 TIMES DAILY
Qty: 90 TABLET | Refills: 0 | Status: SHIPPED | OUTPATIENT
Start: 2022-01-01 | End: 2023-01-01 | Stop reason: SDUPTHER

## 2022-01-01 RX ORDER — MEMANTINE HYDROCHLORIDE 10 MG/1
10 TABLET ORAL 2 TIMES DAILY
Qty: 90 TABLET | Refills: 0 | Status: SHIPPED | OUTPATIENT
Start: 2022-01-01 | End: 2023-01-01 | Stop reason: SDUPTHER

## 2022-01-01 RX ORDER — CLOPIDOGREL BISULFATE 75 MG/1
75 TABLET ORAL DAILY
Qty: 90 TABLET | Refills: 0 | Status: SHIPPED | OUTPATIENT
Start: 2022-01-01 | End: 2023-01-01

## 2022-01-01 RX ORDER — ISOSORBIDE MONONITRATE 30 MG/1
30 TABLET, EXTENDED RELEASE ORAL DAILY
Qty: 30 TABLET | Refills: 0 | Status: SHIPPED | OUTPATIENT
Start: 2022-01-01 | End: 2022-01-01 | Stop reason: SDUPTHER

## 2022-04-10 ENCOUNTER — HISTORICAL (OUTPATIENT)
Dept: ADMINISTRATIVE | Facility: HOSPITAL | Age: 82
End: 2022-04-10

## 2022-04-26 VITALS
SYSTOLIC BLOOD PRESSURE: 104 MMHG | BODY MASS INDEX: 22.15 KG/M2 | HEIGHT: 65 IN | OXYGEN SATURATION: 100 % | DIASTOLIC BLOOD PRESSURE: 58 MMHG | WEIGHT: 132.94 LBS

## 2022-04-30 NOTE — ED PROVIDER NOTES
Patient:   Mike Pettit            MRN: [id number]            FIN: [id number]               Age:   78 years     Sex:  Male     :  1940   Associated Diagnoses:   Weakness; MARY (acute kidney injury); Chronic kidney disease (CKD)   Author:   Drew Cruz MD      Basic Information   Time seen: Date 3/2/2019, Immediately upon arrival.   History source: Patient.   Arrival mode: Private vehicle.   History limitation: None.   Additional information: Chief Complaint from Nursing Triage Note : Chief Complaint   3/2/2019 16:31 CST       Chief Complaint           from  urgent care for decreased kidney function.    3/2/2019 14:36 CST       Chief Complaint           cough, chills  x 3 days  .      History of Present Illness   The patient presents with cough dry past 3 days/ brought to urgent care today and had labs with Acute on chronic CKD. Son at bed side and translating . patient from [place].  The onset was 3  days ago.  Risk factors consist of 2015 with hx of CVA.     The onset was 3  days ago.  The character of symptoms is pain.  The degree at onset was minimal.  The degree at present is minimal.  Risk factors consist of coronary artery disease.  Associated symptoms: shortness of breath, chills, denies nausea, denies vomiting and denies fever.        Review of Systems   Constitutional symptoms:  Chills, no fever, no weakness, no fatigue, no decreased activity.    Skin symptoms:  No jaundice, no rash, no pruritus.    Eye symptoms:  No recent vision problems,    ENMT symptoms:  No sore throat, no nasal congestion.    Respiratory symptoms:  No orthopnea, no sputum production.    Cardiovascular symptoms:  No chest pain, no palpitations, no syncope, no diaphoresis.    Gastrointestinal symptoms:  No abdominal pain, no nausea, no vomiting, no diarrhea.    Genitourinary symptoms:  No dysuria,    Musculoskeletal symptoms:  No back pain, no Muscle pain, no Joint pain.    Neurologic symptoms:  No  headache, no dizziness, no altered level of consciousness.    Psychiatric symptoms:  No anxiety, no depression, no sleeping problems, no substance abuse.    Endocrine symptoms:  No polyuria, no polydipsia, no polyphagia, no hyperglycemia.    Hematologic/Lymphatic symptoms:  Bleeding tendency negative, bruising tendency negative, no petechiae, no gum bleeding.    Allergy/immunologic symptoms:  No food allergies, no recurrent infections, no impaired immunity.              Additional review of systems information: All other systems reviewed and otherwise negative.      Health Status   Allergies:    Allergic Reactions (Selected)  No Known Medication Allergies,    Allergies (1) Active Reaction  No Known Medication Allergies None Documented.   Medications:  (Selected)   Prescriptions  Prescribed  Colace 100 mg oral capsule: 200 mg = 2 cap(s), Oral, At Bedtime, # 60 cap(s), 0 Refill(s), other reason (Rx)  Plavix 75 mg oral tablet: 75 mg = 1 tab(s), Oral, Daily, # 30 tab(s), 3 Refill(s), Pharmacy: [facility]  Seroquel 100 mg oral tablet: See Instructions, Take 1.5 tablets by mouth at bedtime, # 45 tab(s), 3 Refill(s), Pharmacy: [facility]  Xanax 0.5 mg oral tablet: 0.5 mg = 1 tab(s), Oral, BID, # 60 tab(s), 2 Refill(s), Pharmacy: [facility]  Zestril 20 mg oral tablet: See Instructions, TAKE 1 TABLET BY MOUTH DAILY, # 30 tab(s), 1 Refill(s), eRx: [facility]  carvedilol 3.125 mg oral tablet: See Instructions, TAKE 1 TABLET BY MOUTH TWICE DAILY, # 60 tab(s), 1 Refill(s), eRx: [facility]  lactulose 10 g/15 mL oral syrup: 20 gm = 30 mL, Oral, Daily, # 420 mL, 11 Refill(s), Pharmacy: [facility]  memantine 10 mg oral tablet: 10 mg = 1 tab(s), Oral, BID, # 60 tab(s), 3 Refill(s), Pharmacy: [facility]  oxybutynin 5 mg oral tablet: 5 mg = 1 tab(s), Oral, At Bedtime, # 30 tab(s), 3 Refill(s), Pharmacy: [facility]  oxybutynin 5 mg  oral tablet: 5 mg = 1 tab(s), Oral, At Bedtime, # 30 tab(s), 3 Refill(s), Pharmacy: [facility]  simvastatin 40 mg oral tablet: See Instructions, TAKE 1 TABLET BY MOUTH AT BEDTIME, # 30 tab(s), 1 Refill(s), eRx: [facility].      Past Medical/ Family/ Social History   Medical history:    Resolved  CVA (cerebral vascular accident) ([id number]):  Resolved in 2012 at 71 years.  MI (myocardial infarction) ([id number]):  Resolved.  HTN (hypertension) ([id number]):  Resolved.  Asthma ([id number]):  Resolved., Reviewed as documented in chart.   Surgical history:    NONE on 9/28/2018 at 78 Years., Reviewed as documented in chart.   Family history:    History is unknown., Reviewed as documented in chart.   Social history: Reviewed as documented in chart.   Problem list:    Active Problems (7)  Constipation   Dementia   Excessive daytime sleepiness   Hallucinations   Hx of myocardial infarction   Insomnia   Vascular dementia .      Physical Examination               Vital Signs             Time:  3/2/2019 17:01:00.   Vital Signs   3/2/2019 16:40 CST       Peripheral Pulse Rate     90 bpm                             Respiratory Rate          16 br/min                             SpO2                      100 %    3/2/2019 16:31 CST       Temperature Oral          36.9 DegC                             Temperature Oral (calculated)             98.42 DegF                             Peripheral Pulse Rate     93 bpm                             Respiratory Rate          17 br/min                             SpO2                      98 %                             Oxygen Therapy            Room air                             Systolic Blood Pressure   130 mmHg                             Diastolic Blood Pressure  75 mmHg    3/2/2019 16:11 CST       Temperature Oral          36.6 DegC                              Temperature Oral (calculated)             97.88 DegF                             Peripheral Pulse Rate     94 bpm                             SpO2                      100 %                             Systolic Blood Pressure   129 mmHg                             Diastolic Blood Pressure  80 mmHg                             Mean Arterial Pressure, Cuff              96 mmHg                             Blood Pressure Location   Right arm                             Manual Cuff BP            No    3/2/2019 14:36 CST       Temperature Oral          36.9 DegC                             Temperature Oral (calculated)             98.42 DegF                             Peripheral Pulse Rate     105 bpm  HI                             Respiratory Rate          18 br/min                             SpO2                      99 %                             Systolic Blood Pressure   119 mmHg                             Diastolic Blood Pressure  79 mmHg                             Blood Pressure Location   Right arm                             Manual Cuff BP            No                             O2 SAT at rest            99 %  .      Vital Signs (last 24 hrs)_____  Last Charted___________  Temp Oral     36.9 DegC  (MAR 02 16:31)  Heart Rate Peripheral   90 bpm  (MAR 02 16:40)  Resp Rate         16 br/min  (MAR 02 16:40)  SBP      130 mmHg  (MAR 02 16:31)  DBP      75 mmHg  (MAR 02 16:31)  SpO2      100 %  (MAR 02 16:40)  Weight      64 kg  (MAR 02 16:31)  Height      170 cm  (MAR 02 16:31)  BMI      22.15  (MAR 02 16:31).   Measurements   3/2/2019 16:31 CST       Weight Dosing             64 kg                             Weight Measured           64 kg                             Weight Measured and Calculated in Lbs     141.09 lb                             Height/Length Dosing      170 cm                             Height/Length Measured    170 cm                             Body Mass Index Measured  22.15 kg/m2     3/2/2019 14:36 CST       Weight Dosing             68.8 kg                             Weight Measured           68.8 kg                             Weight Measured and Calculated in Lbs     151.68 lb                             Height/Length Dosing      158 cm                             Height/Length Measured    158 cm                             BSA Measured              1.74 m2                             Body Mass Index Measured  27.56 kg/m2  .   Basic Oxygen Information   3/2/2019 16:40 CST       SpO2                      100 %    3/2/2019 16:31 CST       SpO2                      98 %                             Oxygen Therapy            Room air    3/2/2019 16:11 CST       SpO2                      100 %    3/2/2019 14:36 CST       SpO2                      99 %  .   General:  Alert, no acute distress.    Skin:  Warm, pink, intact, moist, no pallor, no rash, normal for ethnicity.    Head:  Normocephalic, atraumatic.    Neck:  Supple, trachea midline, no tenderness, no JVD, no carotid bruit.    Eye:  Pupils are equal, round and reactive to light, extraocular movements are intact, normal conjunctiva.    Ears, nose, mouth and throat:  Tympanic membranes clear, oral mucosa moist, no pharyngeal erythema or exudate.    Cardiovascular:  Regular rate and rhythm, No murmur, Normal peripheral perfusion, No edema.    Respiratory:  Lungs are clear to auscultation, respirations are non-labored, breath sounds are equal, Symmetrical chest wall expansion.    Chest wall:  No tenderness, No deformity.    Back:  Nontender, Normal range of motion, Normal alignment, no step-offs.    Musculoskeletal:  Normal ROM, normal strength, no tenderness, no swelling.    Gastrointestinal:  Soft, Nontender, Non distended, Normal bowel sounds, No organomegaly.    Genitourinary   Neurological:  Alert and oriented to person, place, time, and situation, No focal neurological deficit observed, CN II-XII intact, normal sensory observed, normal  motor observed.    Lymphatics:  No lymphadenopathy.   Psychiatric:  Cooperative, appropriate mood & affect, normal judgment, non-suicidal.       Medical Decision Making   Electrocardiogram:  Time 3/2/2019 17:04:00, rate 90, normal sinus rhythm, EP Interp, RBBB.    Results review:     No qualifying data available.      Reexamination/ Reevaluation   Vital signs   Basic Oxygen Information   3/2/2019 16:40 CST       SpO2                      100 %    3/2/2019 16:31 CST       SpO2                      98 %                             Oxygen Therapy            Room air    3/2/2019 16:11 CST       SpO2                      100 %    3/2/2019 14:36 CST       SpO2                      99 %        Impression and Plan   Diagnosis   Weakness (NIS35-QJ R53.1)   MARY (acute kidney injury) ([id number]-QU N17.9)   Chronic kidney disease (CKD) ([id number]-PP N18.9)      Calls-Consults   -  3/2/2019 18:31:00 , FM on Call, consult.    Plan   Disposition: Admit time  3/2/2019 18:32:00, Place in Observation Telemetry Unit, Dispositioned by: Time: 3/2/2019 18:32:00, Nancy BISHOP, Drew MORE

## 2022-05-03 NOTE — HISTORICAL [facility] CERNER
This is a historical note converted from Shannon. Formatting and pictures may have been removed.  Please reference Shannon for original formatting and attached multimedia. Chief Complaint  Regular follow up with med refills  History of Present Illness  79 yo M with PMH of CAD, Dementia, s/p stent for CAD?comes to the clinic accompanied with his son.? Patients son?declines for  line.? Patient speaks [place] language. ?I am familiar with?Ind[place]ian?and can converse?fluently with him.  ?   Acute issues  No issues today.? Comes for?medication refill.  ?   Chronic issues  Coronary artery disease-  Patient is s/p AICD, stenting for 90% occlusion of an unknown artery on 7/2020.? Has been following a cardiologist named Dr. Cleary near [facility].? Last appointment was 6 months ago.? Patient is compliant on metoprolol, Plavix, aspirin.? He is out of his medication and requested medication refill.? As of today denies of any chest pain, shortness of breath, palpitations, lower extremity swelling, headaches, presyncopal or syncopal episodes.  ?  Urinary incontinence-  Per patients son?his urine incontinence has been?better?lately.? Has not been having any accidents  ?   Dementia-  No change in his daily life activities. ?Can do?most of his stuff on his own.? Memory has been the same. ?Patient is compliant with?memantine 10 mg?once daily and Seroquel 100 mg?once daily.? Son requested if we could go up on Seroquel dosage.? When talking in detail,?the patient?sleeps throughout the day?and is less?sleepy?at night.  ?   Health Management  Shingrix and PCV 23 pending.  PCV 13- 8/2019  FIT test due  Flu shot done  ?  Review of Systems  Constitutional:?no fever, fatigue, weakness  Respiratory:?no cough, no wheezing, no shortness of breath  Cardiovascular:?no chest pain, no palpitations, no edema  Gastrointestinal:?no nausea, vomiting, or diarrhea. No abdominal pain  Neurologic: no headache, no dizziness, no weakness or  numbness  Physical Exam  Vitals & Measurements  T:?36.4? ?C (Oral)? HR:?74(Peripheral)? RR:?20? BP:?116/77? SpO2:?96%?  HT:?165.00?cm? WT:?60.700?kg? BMI:?22.3?  General:?not in acute distress  Respiratory:?clear to auscultation bilaterally. No rales, wheezing, or rhonchi. Chest expansion symmetrical  Cardiovascular:?regular rate and rhythm without murmurs.  Gastrointestinal:?soft, non-tender, non-distended with normal bowel sounds, without masses to palpation  Musculoskeletal:??Normal strength and tone  Integumentary: no rashes or skin lesions present  Neurologic: no signs of peripheral neurological deficit.  Assessment/Plan  1.?CAD (coronary artery disease)?I25.10  Continue metoprolol, aspirin, Plavix as prescribed by the cardiologist  Maintain the physical activity and??follow-up with a cardiologist  Ordered:  CBC w/ Auto Diff, Routine collect, 01/29/21 14:51:00 CST, Blood, Stop date 01/29/21 14:51:00 CST, Lab Collect, CAD (coronary artery disease), 01/29/21 14:51:00 CST  Clinic Follow up, *Est. 05/29/21 3:00:00 CDT, Order for future visit, CAD (coronary artery disease), [facility]  Comprehensive Metabolic Panel, Routine collect, 01/29/21 14:51:00 CST, Blood, Stop date 01/29/21 14:51:00 CST, Lab Collect, CAD (coronary artery disease), 01/29/21 14:51:00 CST  Hemoglobin A1C [facility], Routine collect, 01/29/21 14:51:00 CST, Blood, Stop date 01/29/21 14:51:00 CST, Lab Collect, CAD (coronary artery disease), 01/29/21 14:51:00 CST  Lipid Panel, Routine collect, 01/29/21 14:51:00 CST, Blood, Stop date 01/29/21 14:51:00 CST, Lab Collect, CAD (coronary artery disease), 01/29/21 14:51:00 CST  Uric Acid, Routine collect, 01/29/21 14:51:00 CST, Blood, Stop date 01/29/21 14:51:00 CST, Lab Collect, CAD (coronary artery disease), 01/29/21 14:51:00 CST  Vitamin B12 Level, Routine collect, 01/29/21 14:51:00 CST, Blood, Stop date 01/29/21 14:51:00 CST, Lab Collect, CAD (coronary artery disease), 01/29/21 14:51:00  CST  Vitamin D, 25-Hydroxy Level, Routine collect, 01/29/21 14:51:00 CST, Blood, Stop date 01/29/21 14:51:00 CST, Lab Collect, CAD (coronary artery disease), 01/29/21 14:51:00 CST  ?  2.?Immunization due?Z23  The order ?is in the computer, ?patient was in rush. Will get it done next time  Is about to get COVID vaccine.? Will get Shingrix in the next clinic visit  Ordered:  pneumococcal 23-polyvalent vaccine, 0.5 mL, form: Injection, IM, Once-NOW, first dose 01/29/21 15:05:00 CST, stop date 01/29/21 15:05:00 CST, Waste Code BKC  ?  3.?Dementia?F03.90  Continue?memantine 10 mg once daily  Keep scheduled follow-up with?geriatrics  As of now continue?with Seroquel 100 mg?once daily  Discussed with the patient about sleep hygiene?and increase physical activity.  ?  Orders:  aspirin, 81 mg = 1 tab(s), Oral, Daily, # 30 tab(s), 3 Refill(s), Pharmacy: [facility], 165, cm, Height/Length Dosing, 01/29/21 13:50:00 CST, 60.7, kg, Weight Dosing, 01/29/21 13:50:00 CST  atorvastatin, 20 mg = 1 tab(s), Oral, qPM, # 30 tab(s), 3 Refill(s), Pharmacy: [facility], 165, cm, Height/Length Dosing, 01/29/21 13:50:00 CST, 60.7, kg, Weight Dosing, 01/29/21 13:50:00 CST  cholecalciferol, 5,000 IntUnit = 5 cap(s), Oral, Daily, # 150 cap(s), 3 Refill(s), Pharmacy: [facility], 165, cm, Height/Length Dosing, 01/29/21 13:50:00 CST, 60.7, kg, Weight Dosing, 01/29/21 13:50:00 CST  clopidogrel, 75 mg = 1 tab(s), Oral, Daily, # 30 tab(s), 3 Refill(s), Pharmacy: [facility], 165, cm, Height/Length Dosing, 01/29/21 13:50:00 CST, 60.7, kg, Weight Dosing, 01/29/21 13:50:00 CST  metoprolol, 25 mg = 1 tab(s), Oral, BID, # 60 tab(s), 3 Refill(s), Pharmacy: [facility], 165, cm, Height/Length Dosing, 01/29/21 13:50:00 CST, 60.7, kg, Weight Dosing, 01/29/21 13:50:00 CST  QUEtiapine, 100 mg = 1 tab(s), Oral, qPM, # 30 tab(s), 3 Refill(s), Pharmacy: [facility], 165, cm, Height/Length Dosing, 01/29/21 13:50:00 CST, 60.7, kg, Weight Dosing, 01/29/21 13:50:00 CST   Problem List/Past Medical History  Ongoing  CAD (coronary artery disease)  Dementia  Hallucinations  Hx of myocardial infarction  Insomnia  Historical  Asthma  CVA (cerebral vascular accident)  HTN (hypertension)  MI (myocardial infarction)  Procedure/Surgical History  Cardiac pacemaker (08/01/2020)  NONE (09/28/2018)   Medications  aspirin 81 mg oral Delayed Release (EC) tablet, 81 mg= 1 tab(s), Oral, Daily, 5 refills  atorvastatin 20 mg oral tablet, 20 mg= 1 tab(s), Oral, qPM, 6 refills  cholecalciferol 1000 intl units (25 mcg) oral capsule, 5000 IntUnit= 5 cap(s), Oral, Daily, 1 refills  memantine 10 mg oral tablet, 10 mg= 1 tab(s), Oral, BID, 5 refills  metoprolol tartrate 25 mg oral tab, 25 mg= 1 tab(s), Oral, BID, 3 refills  Plavix 75 mg oral tablet, 75 mg= 1 tab(s), Oral, Daily, 1 refills  Seroquel 100 mg oral tablet, 100 mg= 1 tab(s), Oral, qPM, 3 refills  Allergies  No Known Medication Allergies  Social History  Abuse/Neglect  No, No, Yes, 12/09/2020  Alcohol  Past, Alcohol use interferes with work or home: No. Others hurt by drinking: No. Household alcohol concerns: No., 08/13/2020  Employment/School  Retired, 05/01/2017  Exercise  Home/Environment  Lives with Children., 05/01/2017  Nutrition/Health  Regular, Wants to lose weight: No. Sleeping concerns: Yes. Feels highly stressed: No., 04/30/2018  Sexual  Sexually active: No. Sexual orientation: Straight or heterosexual. History of sexual abuse: No. Gender Identity Identifies as male., 04/02/2019  Spiritual/Cultural  Voodoo, 02/20/2020  Substance Use  Never, 04/30/2018  Tobacco  Never (less than 100 in lifetime), N/A, Household tobacco concerns: No. Smokeless Tobacco Use: Never., 12/09/2020  Family History  Family history is unknown  Immunizations  Vaccine Date Status   influenza virus vaccine, inactivated 10/23/2020 Given    influenza virus vaccine, inactivated 01/10/2020 Given   pneumococcal 13-valent conjugate vaccine 08/15/2019 Given   influenza virus vaccine, inactivated 11/20/2018 Given   Health Maintenance  Health Maintenance  ???Pending?(in the next year)  ??? ??OverDue  ??? ? ? ?Advance Directive due??01/02/21??and every 1??year(s)  ??? ? ? ?Cognitive Screening due??01/02/21??and every 1??year(s)  ??? ? ? ?Fall Risk Assessment due??01/02/21??and every 1??year(s)  ??? ? ? ?Functional Assessment due??01/02/21??and every 1??year(s)  ??? ??Due?  ??? ? ? ?Obesity Screening due??01/01/21??and every 1??year(s)  ??? ? ? ?Pneumococcal Vaccine due??01/29/21??Unknown Frequency  ??? ? ? ?Tetanus Vaccine due??01/29/21??and every 10??year(s)  ??? ? ? ?Zoster Vaccine due??01/29/21??Unknown Frequency  ??? ??Due In Future?  ??? ? ? ?Diabetes Screening not due until??02/19/21??and every 1??year(s)  ??? ? ? ?Aspirin Therapy for CVD Prevention not due until??08/17/21??and every 1??year(s)  ??? ? ? ?Body Mass Index Check not due until??09/09/21??and every 1??year(s)  ??? ? ? ?Depression Screening not due until??09/09/21??and every 1??year(s)  ??? ? ? ?ADL Screening not due until??09/09/21??and every 1??year(s)  ??? ? ? ?Influenza Vaccine not due until??10/01/21??and every 1??day(s)  ??? ? ? ?Blood Pressure Screening not due until??10/23/21??and every 1??year(s)  ???Satisfied?(in the past 1 year)  ??? ??Satisfied?  ??? ? ? ?ADL Screening on??09/09/20.??Satisfied by Callie Mendez  ??? ? ? ?Advance Directive on??02/20/20.??Satisfied by Ruth Vaughan  ??? ? ? ?Aspirin Therapy for CVD Prevention on??08/17/20.??Satisfied by Aditya Burdick MD  ??? ? ? ?Blood Pressure Screening on??10/23/20.??Satisfied by Sasha Cowart LPN  ??? ? ? ?Body Mass Index Check on??09/09/20.??Satisfied by Callie Mendez  ??? ? ? ?Cognitive Screening on??08/13/20.??Satisfied by Keanu Lozano LPN  ??? ? ? ?Coronary Artery Disease Maintenance-Antiplatelet  Agent Prescribed on??12/09/20.??Satisfied by Dari Kilgore MD  ??? ? ? ?Depression Screening on??09/09/20.??Satisfied by Callie Mendez  ??? ? ? ?Diabetes Screening on??02/20/20.??Satisfied by Taj Gamboa Jr.  ??? ? ? ?Fall Risk Assessment on??08/13/20.??Satisfied by Blake MCKEON, Keanu  ??? ? ? ?Functional Assessment on??12/09/20.??Satisfied by Filomena MCKEON, Deirdre  ??? ? ? ?Influenza Vaccine on??10/23/20.??[facility] by [facility] Sasha MCKEON  ??? ? ? ?Obesity Screening on??09/09/20.??Satisfied by Callie Mendez  ?      Patient discussed with resident. ?Chart was reviewed including vitals, labs, etc. Care provided reasonable and necessary.?I participated in the management of the patient and was immediately available throughout the encounter. Services were furnished in a primary care center located in the outpatient department of a [facility]. I agree with the residents findings and plan as documented in the residents note.

## 2022-07-27 PROBLEM — T17.308A CHOKING: Status: ACTIVE | Noted: 2022-01-01

## 2022-07-27 PROBLEM — I10 HYPERTENSION: Status: ACTIVE | Noted: 2022-01-01

## 2022-07-27 PROBLEM — I25.10 ATHEROSCLEROSIS OF CORONARY ARTERY: Status: ACTIVE | Noted: 2022-01-01

## 2022-07-27 PROBLEM — F03.90 DEMENTIA: Status: ACTIVE | Noted: 2022-01-01

## 2022-07-27 PROBLEM — I25.2 HISTORY OF MYOCARDIAL INFARCTION: Status: ACTIVE | Noted: 2022-01-01

## 2022-07-27 PROBLEM — E66.9 OBESITY: Status: ACTIVE | Noted: 2022-01-01

## 2022-07-27 PROBLEM — R44.3 HALLUCINATIONS: Status: ACTIVE | Noted: 2022-01-01

## 2022-07-27 PROBLEM — G47.00 INSOMNIA: Status: ACTIVE | Noted: 2022-01-01

## 2022-07-28 NOTE — PROGRESS NOTES
Refill Rx of memantine 10 mg was printed at the [facility] instead of e-prescribed. Messaged  on secure chat to contact patient for Rx script pickup which I left at the [facility] in there resident clinic room after patient not answering over phone.

## 2022-07-29 NOTE — TELEPHONE ENCOUNTER
Patient was in hospital in [facility],and will be going to [facility] for 10 day need medicine list that is is ok to give Seroquel 100 mg at night and fax to [phone].  Thank you.

## 2022-09-26 PROBLEM — I42.0 PRIMARY DILATED CARDIOMYOPATHY: Status: ACTIVE | Noted: 2020-08-28

## 2022-09-26 NOTE — PROGRESS NOTES
[facility]    Subjective:       Patient ID: Mike Pettit is a 82 y.o. male.    Chief Complaint: Follow-up (No c/o) and Medication Refill    Pt son reports recent hospitalization for CVA  in [place]. Pacemaker replaced while hospitalized per son. Pt stayed in care facility after discharge. Now living with this son. Pt is eating well and tolerating pills PO. No recent falls. Residual weakness left LE after CVA. Eliquis added to medication regimen at discharge. Possible addition of valsartan 40 mg as well.   Son reports regular Bm with use of colace PRN. Pt occasionally reports pain with urination; mostly when wearing briefs. No odor, changes from baseline mentation, or change in color.       Current Outpatient Medications:     apixaban (ELIQUIS) 2.5 mg Tab, Take 1 tablet (2.5 mg total) by mouth 2 (two) times daily., Disp: 60 tablet, Rfl: 0    aspirin (ECOTRIN) 81 MG EC tablet, Take 81 mg by mouth once daily at 6am., Disp: , Rfl:     atorvastatin (LIPITOR) 20 MG tablet, Take 1 tablet (20 mg total) by mouth once daily., Disp: 90 tablet, Rfl: 0    clopidogreL (PLAVIX) 75 mg tablet, Take 1 tablet (75 mg total) by mouth once daily., Disp: 90 tablet, Rfl: 0    docusate sodium (COLACE) 100 MG capsule, Take 2 capsules (200 mg total) by mouth nightly as needed for Constipation., Disp: 180 capsule, Rfl: 0    isosorbide mononitrate (IMDUR) 30 MG 24 hr tablet, Take 1 tablet (30 mg total) by mouth once daily., Disp: 90 tablet, Rfl: 0    lisinopriL (PRINIVIL,ZESTRIL) 2.5 MG tablet, Take 1 tablet (2.5 mg total) by mouth once daily., Disp: 90 tablet, Rfl: 0    losartan (COZAAR) 25 MG tablet, Take 1 tablet (25 mg total) by mouth once daily., Disp: 90 tablet, Rfl: 0    memantine (NAMENDA) 10 MG Tab, Take 1 tablet (10 mg total) by mouth 2 (two) times daily., Disp: 90 tablet, Rfl: 0    metoprolol tartrate (LOPRESSOR) 12.5 mg tablet, Take 12.5 tablets by mouth 2 (two) times daily., Disp: , Rfl:     metoprolol tartrate  (LOPRESSOR) 25 MG tablet, Take 0.5 tablets (12.5 mg total) by mouth 2 (two) times daily., Disp: 90 tablet, Rfl: 0    nitroGLYCERIN (NITROSTAT) 0.4 MG SL tablet, Place 0.4 mg under the tongue every 5 (five) minutes as needed., Disp: , Rfl:     QUEtiapine (SEROQUEL) 100 MG Tab, Take 1 tablet (100 mg total) by mouth every evening., Disp: 90 tablet, Rfl: 0      Review of Systems   Constitutional:  Negative for activity change, fever and unexpected weight change.   HENT:  Negative for drooling and trouble swallowing.    Eyes:  Negative for visual disturbance.   Respiratory:  Negative for chest tightness and wheezing.    Cardiovascular:  Negative for chest pain and palpitations.   Gastrointestinal:  Negative for blood in stool, constipation, diarrhea and vomiting.   Genitourinary:  Negative for difficulty urinating, hematuria and urgency.   Musculoskeletal:  Negative for joint swelling and neck pain.   Neurological:  Positive for weakness. Negative for syncope and headaches.   Psychiatric/Behavioral:  Positive for confusion. Negative for dysphoric mood.        Objective:      Vitals:    09/26/22 1411   BP: (!) 101/50   Pulse: (!) 58   Resp: 18       Physical Exam  Constitutional:       General: He is not in acute distress.     Comments: Frail, in wheelchair   Cardiovascular:      Rate and Rhythm: Regular rhythm. Bradycardia present.   Pulmonary:      Effort: Pulmonary effort is normal. No respiratory distress.      Breath sounds: Normal breath sounds. No wheezing.   Abdominal:      General: Abdomen is flat. There is no distension.      Palpations: Abdomen is soft.      Tenderness: There is no abdominal tenderness.   Musculoskeletal:         General: No tenderness.      Right lower leg: No edema.      Left lower leg: No edema.   Skin:     General: Skin is warm and dry.   Neurological:      Comments: Appropriate responsives to limited simple questioning, speaking less english as dementia progresses per son. Weakness LLE as  compared to Rt       Assessment:       Problem List Items Addressed This Visit    None  Visit Diagnoses       Encounter for medication refill    -  Primary    Dysuria        Relevant Orders    Urinalysis    Urine Culture High Risk            Plan:       All medications reviewed with pt's son.   Refills provided.   Continue with losartan 25 mg daily, further change per Cardiology, (10/3/22)  UA and Urine cx  ordered      RTC in 3 months    Loly Miller M.D.  [facility] -2

## 2023-01-01 ENCOUNTER — OFFICE VISIT (OUTPATIENT)
Dept: FAMILY MEDICINE | Facility: CLINIC | Age: 83
End: 2023-01-01
Payer: MEDICAID

## 2023-01-01 ENCOUNTER — HOSPITAL ENCOUNTER (EMERGENCY)
Facility: HOSPITAL | Age: 83
End: 2023-06-07
Attending: EMERGENCY MEDICINE
Payer: MEDICAID

## 2023-01-01 ENCOUNTER — TELEPHONE (OUTPATIENT)
Dept: FAMILY MEDICINE | Facility: CLINIC | Age: 83
End: 2023-01-01
Payer: MEDICAID

## 2023-01-01 ENCOUNTER — PATIENT MESSAGE (OUTPATIENT)
Dept: RESEARCH | Facility: HOSPITAL | Age: 83
End: 2023-01-01
Payer: MEDICAID

## 2023-01-01 VITALS
SYSTOLIC BLOOD PRESSURE: 163 MMHG | DIASTOLIC BLOOD PRESSURE: 75 MMHG | OXYGEN SATURATION: 100 % | BODY MASS INDEX: 22.53 KG/M2 | WEIGHT: 132 LBS | TEMPERATURE: 99 F | RESPIRATION RATE: 18 BRPM | HEIGHT: 64 IN | HEART RATE: 80 BPM

## 2023-01-01 VITALS — WEIGHT: 125 LBS | HEIGHT: 68 IN | RESPIRATION RATE: 12 BRPM | BODY MASS INDEX: 18.94 KG/M2

## 2023-01-01 VITALS
WEIGHT: 132 LBS | TEMPERATURE: 98 F | DIASTOLIC BLOOD PRESSURE: 68 MMHG | SYSTOLIC BLOOD PRESSURE: 138 MMHG | HEIGHT: 64 IN | OXYGEN SATURATION: 100 % | BODY MASS INDEX: 22.53 KG/M2 | HEART RATE: 61 BPM

## 2023-01-01 DIAGNOSIS — I10 HYPERTENSION, UNSPECIFIED TYPE: ICD-10-CM

## 2023-01-01 DIAGNOSIS — Z76.0 MEDICATION REFILL: Primary | ICD-10-CM

## 2023-01-01 DIAGNOSIS — F03.90 DEMENTIA, UNSPECIFIED DEMENTIA SEVERITY, UNSPECIFIED DEMENTIA TYPE, UNSPECIFIED WHETHER BEHAVIORAL, PSYCHOTIC, OR MOOD DISTURBANCE OR ANXIETY: Primary | ICD-10-CM

## 2023-01-01 DIAGNOSIS — M25.471 ANKLE EDEMA, BILATERAL: ICD-10-CM

## 2023-01-01 DIAGNOSIS — Z28.21 INFLUENZA VACCINATION DECLINED: ICD-10-CM

## 2023-01-01 DIAGNOSIS — I10 HYPERTENSION, UNSPECIFIED TYPE: Primary | ICD-10-CM

## 2023-01-01 DIAGNOSIS — M25.472 ANKLE EDEMA, BILATERAL: ICD-10-CM

## 2023-01-01 DIAGNOSIS — I42.0 PRIMARY DILATED CARDIOMYOPATHY: ICD-10-CM

## 2023-01-01 DIAGNOSIS — F03.90 DEMENTIA, UNSPECIFIED DEMENTIA SEVERITY, UNSPECIFIED DEMENTIA TYPE, UNSPECIFIED WHETHER BEHAVIORAL, PSYCHOTIC, OR MOOD DISTURBANCE OR ANXIETY: ICD-10-CM

## 2023-01-01 DIAGNOSIS — I46.8 TRAUMATIC CARDIAC ARREST: Primary | ICD-10-CM

## 2023-01-01 DIAGNOSIS — Z95.810 ICD (IMPLANTABLE CARDIOVERTER-DEFIBRILLATOR) IN PLACE: ICD-10-CM

## 2023-01-01 LAB
ABO + RH BLD: NORMAL
ABO + RH BLD: NORMAL
BLD PROD TYP BPU: NORMAL
BLD PROD TYP BPU: NORMAL
BLOOD UNIT EXPIRATION DATE: NORMAL
BLOOD UNIT EXPIRATION DATE: NORMAL
BLOOD UNIT TYPE CODE: 5100
BLOOD UNIT TYPE CODE: 5100
CROSSMATCH INTERPRETATION: NORMAL
CROSSMATCH INTERPRETATION: NORMAL
DISPENSE STATUS: NORMAL
DISPENSE STATUS: NORMAL
UNIT NUMBER: NORMAL
UNIT NUMBER: NORMAL

## 2023-01-01 PROCEDURE — 99291 CRITICAL CARE FIRST HOUR: CPT

## 2023-01-01 PROCEDURE — 32551 INSERTION OF CHEST TUBE: CPT | Mod: 50

## 2023-01-01 PROCEDURE — 92950 HEART/LUNG RESUSCITATION CPR: CPT

## 2023-01-01 PROCEDURE — 63600175 PHARM REV CODE 636 W HCPCS

## 2023-01-01 PROCEDURE — 25000003 PHARM REV CODE 250

## 2023-01-01 PROCEDURE — 36430 TRANSFUSION BLD/BLD COMPNT: CPT

## 2023-01-01 PROCEDURE — 36556 INSERT NON-TUNNEL CV CATH: CPT | Mod: RT

## 2023-01-01 PROCEDURE — 99213 OFFICE O/P EST LOW 20 MIN: CPT | Mod: PBBFAC

## 2023-01-01 PROCEDURE — 99900035 HC TECH TIME PER 15 MIN (STAT)

## 2023-01-01 PROCEDURE — 63600175 PHARM REV CODE 636 W HCPCS: Performed by: SURGERY

## 2023-01-01 PROCEDURE — G0390 TRAUMA RESPONS W/HOSP CRITI: HCPCS

## 2023-01-01 PROCEDURE — 96374 THER/PROPH/DIAG INJ IV PUSH: CPT | Mod: 59

## 2023-01-01 PROCEDURE — 25000003 PHARM REV CODE 250: Performed by: SURGERY

## 2023-01-01 RX ORDER — APIXABAN 2.5 MG/1
2.5 TABLET, FILM COATED ORAL 2 TIMES DAILY
COMMUNITY
Start: 2023-01-01

## 2023-01-01 RX ORDER — SODIUM BICARBONATE 1 MEQ/ML
SYRINGE (ML) INTRAVENOUS CODE/TRAUMA/SEDATION MEDICATION
Status: COMPLETED | OUTPATIENT
Start: 2023-01-01 | End: 2023-01-01

## 2023-01-01 RX ORDER — ATORVASTATIN CALCIUM 20 MG/1
20 TABLET, FILM COATED ORAL DAILY
Qty: 90 TABLET | Refills: 3 | Status: SHIPPED | OUTPATIENT
Start: 2023-01-01

## 2023-01-01 RX ORDER — CALCIUM CHLORIDE INJECTION 100 MG/ML
INJECTION, SOLUTION INTRAVENOUS CODE/TRAUMA/SEDATION MEDICATION
Status: COMPLETED | OUTPATIENT
Start: 2023-01-01 | End: 2023-01-01

## 2023-01-01 RX ORDER — ISOSORBIDE MONONITRATE 30 MG/1
30 TABLET, EXTENDED RELEASE ORAL DAILY
Qty: 90 TABLET | Refills: 0 | Status: SHIPPED | OUTPATIENT
Start: 2023-01-01

## 2023-01-01 RX ORDER — EPINEPHRINE 0.1 MG/ML
INJECTION INTRAVENOUS CODE/TRAUMA/SEDATION MEDICATION
Status: COMPLETED | OUTPATIENT
Start: 2023-01-01 | End: 2023-01-01

## 2023-01-01 RX ORDER — LOSARTAN POTASSIUM 25 MG/1
25 TABLET ORAL DAILY
Qty: 90 TABLET | Refills: 3 | Status: SHIPPED | OUTPATIENT
Start: 2023-01-01

## 2023-01-01 RX ORDER — NITROGLYCERIN 0.4 MG/1
0.4 TABLET SUBLINGUAL EVERY 5 MIN PRN
Qty: 30 TABLET | Refills: 0 | Status: SHIPPED | OUTPATIENT
Start: 2023-01-01

## 2023-01-01 RX ORDER — MEMANTINE HYDROCHLORIDE 10 MG/1
10 TABLET ORAL 2 TIMES DAILY
Qty: 90 TABLET | Refills: 3 | Status: SHIPPED | OUTPATIENT
Start: 2023-01-01

## 2023-01-01 RX ORDER — QUETIAPINE FUMARATE 100 MG/1
100 TABLET, FILM COATED ORAL NIGHTLY
Qty: 90 TABLET | Refills: 3 | Status: SHIPPED | OUTPATIENT
Start: 2023-01-01

## 2023-01-01 RX ORDER — METOPROLOL TARTRATE 25 MG/1
12.5 TABLET, FILM COATED ORAL 2 TIMES DAILY
Qty: 90 TABLET | Refills: 3 | Status: SHIPPED | OUTPATIENT
Start: 2023-01-01 | End: 2024-01-03

## 2023-01-01 RX ADMIN — EPINEPHRINE 1 MG: 0.1 INJECTION INTRAVENOUS at 11:06

## 2023-01-01 RX ADMIN — CALCIUM CHLORIDE INJECTION 1 G: 100 INJECTION, SOLUTION INTRAVENOUS at 11:06

## 2023-01-01 RX ADMIN — SODIUM BICARBONATE 50 MEQ: 84 INJECTION INTRAVENOUS at 11:06

## 2023-01-03 NOTE — PROGRESS NOTES
[facility]    Subjective:       Patient ID: Mike Pettit is a 82 y.o. male.    Chief Complaint: Medication Refill and c/o bilateral foot swelling    Pt accompanied by son who is his full time caretaker. Help from other family members for cleaning and meals. Son reports 3 BM weekly with Prn use of Colace. No change in urination. Pt eats 3 meals daily with good appetite. Pt reports feeling well with no complaints.   Son concerned for swelling on B/L feet that began 3 days ago. Previous similar episode in the past. Recently less active at home due to weather. No pain or skin discoloration reported.     Chronic conditions:  CVA - admitted in [place] last year with addition of Eliquis   Dementia- memantine and seroquel  Dilated cardiomyopathy & HTN- follows with cardiology. D/c lisinopril and plavix  CAD- previous MI. Lipitor 20 mg    Review of Systems   Constitutional:  Positive for activity change. Negative for appetite change, fatigue, fever and unexpected weight change.   HENT:  Negative for hearing loss and trouble swallowing.    Eyes:  Negative for discharge and visual disturbance.   Respiratory:  Negative for chest tightness and wheezing.    Cardiovascular:  Negative for chest pain.   Gastrointestinal:  Negative for abdominal distention, abdominal pain, blood in stool, diarrhea, vomiting and reflux.   Endocrine: Negative for polydipsia.   Genitourinary:  Negative for difficulty urinating and hematuria.   Musculoskeletal:  Negative for neck pain.   Neurological:  Positive for weakness. Negative for headaches.   Psychiatric/Behavioral:  Positive for confusion and dysphoric mood.          Current Outpatient Medications   Medication Sig Dispense Refill    apixaban (ELIQUIS) 2.5 mg Tab Take 1 tablet (2.5 mg total) by mouth 2 (two) times daily. 180 tablet 3    aspirin (ECOTRIN) 81 MG EC tablet Take 81 mg by mouth once daily at 6am.      atorvastatin (LIPITOR) 20 MG tablet Take 1 tablet (20 mg total)  by mouth once daily. 90 tablet 3    docusate sodium (COLACE) 100 MG capsule Take 2 capsules (200 mg total) by mouth nightly as needed for Constipation. 180 capsule 0    isosorbide mononitrate (IMDUR) 30 MG 24 hr tablet Take 1 tablet (30 mg total) by mouth once daily. 90 tablet 0    losartan (COZAAR) 25 MG tablet Take 1 tablet (25 mg total) by mouth once daily. 90 tablet 3    memantine (NAMENDA) 10 MG Tab Take 1 tablet (10 mg total) by mouth 2 (two) times daily. 90 tablet 3    metoprolol tartrate (LOPRESSOR) 25 MG tablet Take 0.5 tablets (12.5 mg total) by mouth 2 (two) times daily. 90 tablet 3    nitroGLYCERIN (NITROSTAT) 0.4 MG SL tablet Place 1 tablet (0.4 mg total) under the tongue every 5 (five) minutes as needed for Chest pain. 30 tablet 0    QUEtiapine (SEROQUEL) 100 MG Tab Take 1 tablet (100 mg total) by mouth every evening. 90 tablet 3     No current facility-administered medications for this visit.       Objective:      Vitals:    01/03/23 0905   BP: 138/68   Pulse: 61   Temp: 98.1 °F (36.7 °C)       Physical Exam  Constitutional:       General: He is not in acute distress.     Appearance: He is not ill-appearing.      Comments: Frail appearing, clean and well dressed, in wheelchair   HENT:      Head:      Comments: Temporal wasting  Eyes:      Conjunctiva/sclera: Conjunctivae normal.   Cardiovascular:      Rate and Rhythm: Normal rate and regular rhythm.      Heart sounds: No murmur heard.  Pulmonary:      Effort: Pulmonary effort is normal.      Breath sounds: Normal breath sounds.   Abdominal:      General: Bowel sounds are normal. There is no distension.      Palpations: Abdomen is soft.      Tenderness: There is no abdominal tenderness.   Musculoskeletal:         General: No tenderness, deformity or signs of injury.      Comments: Non-pitting edema dorsum B/L feet, no warmth, non-tender   Skin:     General: Skin is warm and dry.       Assessment:       Problem List Items Addressed This Visit           Neuro    Dementia    Relevant Medications    memantine (NAMENDA) 10 MG Tab       Cardiac/Vascular    Primary dilated cardiomyopathy    Relevant Medications    apixaban (ELIQUIS) 2.5 mg Tab     Other Visit Diagnoses       Medication refill    -  Primary    Ankle edema, bilateral        Influenza vaccination declined                Plan:       Dementia  - Refill Namenda and seroquel  - continue to monitor appetite and intake  - continue colace PRN  - offered referral for home health assistance. Son will consider and reach out if help is needed.    LE Edema  - Encouraged elevation and compression stockings  - likely improved with resuming usual activities  - precautions given should symptoms change with skin discoloration or pain    HTN  - refill metoprolol and losartan  - d/c lisinopril as recommended by cardiology      Decline influenza vaccination today       RTC in 5 months, earlier if needed    Loly Miller M.D.  [facility] HO-2

## 2023-01-04 NOTE — PROGRESS NOTES
Discussed with resident at time of encounter (01-04-23); pt. seen.  Painless feet edema, otherwise no significant change relative to previous visit.    PE  Gen: alert, wheelchair; accompanied by son.  HEENT: no carotid bruits.  Chest: lungs clear.  CV: reg. rhythm, no murmurs.  Ext: mild edema along dorsum of feet.    Resident's note reviewed 01-04-23.  Agree with assessment; plan of care appropriate.  Professional services provided in an outpatient primary care center affiliated with a teaching institution.

## 2023-05-22 NOTE — PROGRESS NOTES
Subjective     Patient ID: Mike Pettit is a 83 y.o. male.    Chief Complaint: Nurising home paper work and Chest Pain    Stays in wheelchair    Review of Systems       Objective   Vitals:    05/22/23 1608   BP: (!) 163/75   Pulse: 80   Resp: 18   Temp: 98.6 °F (37 °C)       Physical Exam       Assessment and Plan     Problem List Items Addressed This Visit    None      ***

## 2023-05-22 NOTE — PROGRESS NOTES
[facility]  Subjective     Patient ID: Mike Pettit is a 83 y.o. male.    Chief Complaint: Nurising home paper work and Chest Pain    Pt accompanied by son who requests form in event pt needs NH care. Son possible going out of country. Will need assistance for care while away. No behavioral problems. Eating well. Sits outside 2 hours per day. No falls. Ambulates w/ wheelchair.     Pt reports discomfort to chest with movement. Pain localized to ICD site. Follows w/ cardiology w/ recent recordings sent.     Chronic conditions:  CVA - Eliquis BID  Dementia- memantine and seroquel  Dilated cardiomyopathy & HTN- follows with cardiology.   CAD- previous MI. Lipitor 20 mg    Review of Systems   Constitutional:  Negative for activity change, appetite change, fatigue and fever.   Respiratory:  Negative for chest tightness and shortness of breath.    Cardiovascular:  Positive for chest pain. Negative for palpitations.   Gastrointestinal:  Negative for change in bowel habit and change in bowel habit.   Genitourinary:  Negative for hematuria.   Psychiatric/Behavioral:  Negative for agitation, behavioral problems and hallucinations.         Objective   Vitals:    05/22/23 1608   BP: (!) 163/75   Pulse: 80   Resp: 18   Temp: 98.6 °F (37 °C)       Physical Exam  Constitutional:       General: He is not in acute distress.     Appearance: He is not ill-appearing.   HENT:      Head: Normocephalic and atraumatic.   Cardiovascular:      Rate and Rhythm: Normal rate and regular rhythm.      Comments: Icd right chest wall.  Pulmonary:      Effort: Pulmonary effort is normal.      Breath sounds: Normal breath sounds.   Abdominal:      General: Bowel sounds are normal. There is no distension.      Palpations: Abdomen is soft.      Tenderness: There is no abdominal tenderness.   Musculoskeletal:      Right lower leg: No edema.      Left lower leg: No edema.   Skin:     General: Skin is warm and dry.   Neurological:       Comments: Oriented to self and general place. interactive          Assessment and Plan     Problem List Items Addressed This Visit          Neuro    Dementia - Primary       Cardiac/Vascular    Hypertension     Other Visit Diagnoses       ICD (implantable cardioverter-defibrillator) in place                Forms Completed for family  Continue Praful  F/u cardiology of ICD reports  Advised family on process for facility admissions. Encouraged to contact w/ needs.     RTC 4 momths, earlier if needed    Loly Miller M.D.  [facility] HO-2
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
